# Patient Record
Sex: FEMALE | URBAN - METROPOLITAN AREA
[De-identification: names, ages, dates, MRNs, and addresses within clinical notes are randomized per-mention and may not be internally consistent; named-entity substitution may affect disease eponyms.]

---

## 2019-10-18 ENCOUNTER — IMPORTED ENCOUNTER (OUTPATIENT)
Dept: URBAN - METROPOLITAN AREA CLINIC 1 | Facility: CLINIC | Age: 34
End: 2019-10-18

## 2019-10-18 PROBLEM — H10.45: Noted: 2019-10-18

## 2019-10-18 PROCEDURE — 92004 COMPRE OPH EXAM NEW PT 1/>: CPT

## 2019-10-18 PROCEDURE — 92015 DETERMINE REFRACTIVE STATE: CPT

## 2019-10-18 NOTE — PATIENT DISCUSSION
1.  Allergic Conjunctivitis OU -- The condition was discussed with the patient. Possible Vernal Conjuncitivits. Begin Alrex TID OU (erx'd). Avoidance of allergens and cool compresses were recommended. 2. Dry Eye OU -- Recommend the use of PF ATs QID OU routinely. D/c Visine. Return for an appointment in 1 month for a 10 with Dr. Matthias Crowe.

## 2022-04-02 ASSESSMENT — VISUAL ACUITY
OD_SC: J1+
OD_CC: 20/20
OS_CC: 20/20
OS_SC: J1+

## 2022-04-02 ASSESSMENT — TONOMETRY
OS_IOP_MMHG: 15
OD_IOP_MMHG: 15